# Patient Record
Sex: FEMALE | Race: WHITE
[De-identification: names, ages, dates, MRNs, and addresses within clinical notes are randomized per-mention and may not be internally consistent; named-entity substitution may affect disease eponyms.]

---

## 2020-09-05 ENCOUNTER — HOSPITAL ENCOUNTER (EMERGENCY)
Dept: HOSPITAL 76 - ED | Age: 30
Discharge: HOME | End: 2020-09-05
Payer: COMMERCIAL

## 2020-09-05 VITALS — SYSTOLIC BLOOD PRESSURE: 128 MMHG | DIASTOLIC BLOOD PRESSURE: 79 MMHG

## 2020-09-05 DIAGNOSIS — Z3A.01: ICD-10-CM

## 2020-09-05 DIAGNOSIS — O20.9: Primary | ICD-10-CM

## 2020-09-05 LAB
ALBUMIN DIAFP-MCNC: 4.5 G/DL (ref 3.2–5.5)
ALBUMIN/GLOB SERPL: 1.7 {RATIO} (ref 1–2.2)
ALP SERPL-CCNC: 48 IU/L (ref 42–121)
ALT SERPL W P-5'-P-CCNC: 14 IU/L (ref 10–60)
ANION GAP SERPL CALCULATED.4IONS-SCNC: 10 MMOL/L (ref 6–13)
AST SERPL W P-5'-P-CCNC: 17 IU/L (ref 10–42)
BASOPHILS NFR BLD AUTO: 0 10^3/UL (ref 0–0.1)
BASOPHILS NFR BLD AUTO: 0.6 %
BILIRUB BLD-MCNC: 0.6 MG/DL (ref 0.2–1)
BUN SERPL-MCNC: 11 MG/DL (ref 6–20)
CALCIUM UR-MCNC: 9.3 MG/DL (ref 8.5–10.3)
CHLORIDE SERPL-SCNC: 103 MMOL/L (ref 101–111)
CLARITY UR REFRACT.AUTO: CLEAR
CO2 SERPL-SCNC: 28 MMOL/L (ref 21–32)
CREAT SERPLBLD-SCNC: 0.7 MG/DL (ref 0.4–1)
EOSINOPHIL # BLD AUTO: 0 10^3/UL (ref 0–0.7)
EOSINOPHIL NFR BLD AUTO: 0.6 %
ERYTHROCYTE [DISTWIDTH] IN BLOOD BY AUTOMATED COUNT: 11.9 % (ref 12–15)
GLOBULIN SER-MCNC: 2.7 G/DL (ref 2.1–4.2)
GLUCOSE SERPL-MCNC: 102 MG/DL (ref 70–100)
GLUCOSE UR QL STRIP.AUTO: NEGATIVE MG/DL
HGB UR QL STRIP: 13.9 G/DL (ref 12–16)
KETONES UR QL STRIP.AUTO: NEGATIVE MG/DL
LIPASE SERPL-CCNC: 26 U/L (ref 22–51)
LYMPHOCYTES # SPEC AUTO: 2 10^3/UL (ref 1.5–3.5)
LYMPHOCYTES NFR BLD AUTO: 28.8 %
MCH RBC QN AUTO: 31.6 PG (ref 27–31)
MCHC RBC AUTO-ENTMCNC: 33.8 G/DL (ref 32–36)
MCV RBC AUTO: 93.4 FL (ref 81–99)
MONOCYTES # BLD AUTO: 0.6 10^3/UL (ref 0–1)
MONOCYTES NFR BLD AUTO: 8.4 %
NEUTROPHILS # BLD AUTO: 4.2 10^3/UL (ref 1.5–6.6)
NEUTROPHILS # SNV AUTO: 6.8 X10^3/UL (ref 4.8–10.8)
NEUTROPHILS NFR BLD AUTO: 61.3 %
NITRITE UR QL STRIP.AUTO: NEGATIVE
PDW BLD AUTO: 9.8 FL (ref 7.9–10.8)
PH UR STRIP.AUTO: 5.5 PH (ref 5–7.5)
PLATELET # BLD: 352 10^3/UL (ref 130–450)
PROT SPEC-MCNC: 7.2 G/DL (ref 6.7–8.2)
PROT UR STRIP.AUTO-MCNC: NEGATIVE MG/DL
RBC # UR STRIP.AUTO: (no result) /UL
RBC # URNS HPF: (no result) /HPF (ref 0–5)
RBC MAR: 4.4 10^6/UL (ref 4.2–5.4)
SODIUM SERPLBLD-SCNC: 141 MMOL/L (ref 135–145)
SP GR UR STRIP.AUTO: 1.02 (ref 1–1.03)
SQUAMOUS URNS QL MICRO: (no result)
UROBILINOGEN UR QL STRIP.AUTO: (no result) E.U./DL
UROBILINOGEN UR STRIP.AUTO-MCNC: NEGATIVE MG/DL

## 2020-09-05 PROCEDURE — 86900 BLOOD TYPING SEROLOGIC ABO: CPT

## 2020-09-05 PROCEDURE — 76801 OB US < 14 WKS SINGLE FETUS: CPT

## 2020-09-05 PROCEDURE — 81001 URINALYSIS AUTO W/SCOPE: CPT

## 2020-09-05 PROCEDURE — 85025 COMPLETE CBC W/AUTO DIFF WBC: CPT

## 2020-09-05 PROCEDURE — 87086 URINE CULTURE/COLONY COUNT: CPT

## 2020-09-05 PROCEDURE — 76817 TRANSVAGINAL US OBSTETRIC: CPT

## 2020-09-05 PROCEDURE — 36415 COLL VENOUS BLD VENIPUNCTURE: CPT

## 2020-09-05 PROCEDURE — 83690 ASSAY OF LIPASE: CPT

## 2020-09-05 PROCEDURE — 81003 URINALYSIS AUTO W/O SCOPE: CPT

## 2020-09-05 PROCEDURE — 86901 BLOOD TYPING SEROLOGIC RH(D): CPT

## 2020-09-05 PROCEDURE — 99284 EMERGENCY DEPT VISIT MOD MDM: CPT

## 2020-09-05 PROCEDURE — 80053 COMPREHEN METABOLIC PANEL: CPT

## 2020-09-05 PROCEDURE — 84702 CHORIONIC GONADOTROPIN TEST: CPT

## 2020-09-05 NOTE — ULTRASOUND REPORT
PROCEDURE: OB Transvaginal

 

INDICATIONS: 6 weeks preg, vag bleed

 

TECHNIQUE: Please refer to the dedicated combined transabdominal and transvaginal scanning sonographi
c report for this patient same day.

 

COMPARISON: None.

 

FINDINGS: 

Please refer to prior report from same day.

 

IMPRESSION: 

Please refer to prior report from same day.

 

Reviewed by: Ab Olvera MD on 9/5/2020 3:05 PM PDT

Approved by: Ab Olvera MD on 9/5/2020 3:05 PM PDT

 

 

Station ID:  [id number]

## 2020-09-05 NOTE — ULTRASOUND REPORT
PROCEDURE:  OB First Trimester

 

INDICATIONS:  6 weeks preg, vag bleed

 

OUTSIDE/PRIOR DATING DATA:  

Last menstrual period (LMP): 7/22/2020.  

LMP-based estimated date of delivery (MARIA): 4/28/2021.  

First dating scan (date and location): Not established by this study.  

Estimated date of delivery (MARIA) from first dating scan: Not established by this study.  

 

TECHNIQUE:  

Real-time scanning was performed of the fetus and maternal pelvic organs, with image documentation.  


 

COMPARISON:  None.

 

FINDINGS:  An intrauterine gestation is not currently seen. A definite intrauterine gestational sac a
lso is not found. A saclike structure however is present measuring 4 mm in average diameter and this 
could represent a very early gestational sac with estimated gestational age from this finding of 5 we
eks 1 day.

 

Embryo:  Not seen at this time.

 

Measurement variability in dating:  +/- 4 weeks by LMP, +/- 7 days by mean sac diameter (use before 6
 weeks gestation if crown-rump length not able to be measured), +/- 5 days by crown-rump length (6-12
 weeks gestation).  

 

Maternal organs:  Ovaries normal considering gestational status.  Limited images through the kidneys 
demonstrate no hydronephrosis.  

 

IMPRESSION:  

A definite intrauterine gestation is not currently found. Follow-up in 7-10 days is recommended for m
ore accurate assessment. No secondary sonographic evidence of ectopic pregnancy also. Correlation wit
h quantitative beta hCG may be the appropriate next step today.

 

Reviewed by: Ab Olvera MD on 9/5/2020 3:04 PM PDT

Approved by: Ab Olvera MD on 9/5/2020 3:04 PM PDT

 

 

Station ID:  [id number]

## 2021-10-22 NOTE — ED PHYSICIAN DOCUMENTATION
History of Present Illness





- Stated complaint


Stated Complaint: FEMALE 





- Chief complaint


Chief Complaint: General





- History obtained from


History obtained from: Patient





- History of Present Illness


Timing: Today


Pain level max: 0


Pain level now: 0





- Additonal information


Additional information: 





Patient is a 29-year-old female who presents to the emergency department with 2 

days of vaginal spotting.  Nothing makes it better or worse.  She is  1, 

para 0.  Approximately 6 weeks EGA.  Mild abdominal cramping.  No fevers.  She 

lives in [place].  Is not on any medications other than prenatal vitamins.  No 

fevers.  No chills.  Nothing makes it better or worse.





Review of Systems


Constitutional: denies: Fever, Chills


Nose: denies: Rhinorrhea / runny nose, Congestion


Throat: denies: Sore throat


Cardiac: denies: Chest pain / pressure


Respiratory: denies: Cough


GI: denies: Nausea, Vomiting, Diarrhea


: denies: Dysuria, Frequency, Hesitancy


Skin: denies: Rash


Musculoskeletal: denies: Neck pain, Back pain


Neurologic: denies: Headache





PD PAST MEDICAL HISTORY





- Past Medical History


Past Medical History: No





- Past Surgical History


Past Surgical History: No





- Living Situation


Living Situation: reports: With family


Living Arrangement: reports: At home





- Social History


Does the pt smoke?: No


Does the pt have substance abuse?: No





- Family History


Family history: reports: Non contributory





PD ED PE NORMAL





- Vitals


Vital signs reviewed: Yes





- General


General: Alert and oriented X 3, No acute distress, Well developed/nourished





- HEENT


HEENT: Moist mucous membranes





- Neck


Neck: Supple, no meningeal sign





- Cardiac


Cardiac: RRR, Strong equal pulses





- Respiratory


Respiratory: No respiratory distress, Clear bilaterally





- Abdomen


Abdomen: Soft, Non tender, Non distended





- Back


Back: No CVA TTP





- Derm


Derm: Warm and dry





- Extremities


Extremities: No edema





- Neuro


Neuro: Alert and oriented X 3





- Psych


Psych: Normal mood, Normal affect





Results





- Vitals


Vitals: 


                               Vital Signs - 24 hr











  20





  13:47 15:26


 


Temperature 36.9 C 37.6 C H


 


Heart Rate 82 73


 


Respiratory 22 18





Rate  


 


Blood Pressure 161/81 H 128/79


 


O2 Saturation 99 99








                                     Oxygen











O2 Source                      Room air

















- Labs


Labs: 


                                Laboratory Tests











  20/20





  14:15 14:15 14:15


 


WBC   6.8 


 


RBC   4.40 


 


Hgb   13.9 


 


Hct   41.1 


 


MCV   93.4 


 


MCH   31.6 H 


 


MCHC   33.8 


 


RDW   11.9 L 


 


Plt Count   352 


 


MPV   9.8 


 


Neut # (Auto)   4.2 


 


Lymph # (Auto)   2.0 


 


Mono # (Auto)   0.6 


 


Eos # (Auto)   0.0 


 


Baso # (Auto)   0.0 


 


Absolute Nucleated RBC   0.00 


 


Nucleated RBC %   0.0 


 


Sodium    141


 


Potassium    3.5


 


Chloride    103


 


Carbon Dioxide    28


 


Anion Gap    10.0


 


BUN    11


 


Creatinine    0.7


 


Estimated GFR (MDRD)    100


 


Glucose    102 H


 


Calcium    9.3


 


Total Bilirubin    0.6


 


AST    17


 


ALT    14


 


Alkaline Phosphatase    48


 


Total Protein    7.2


 


Albumin    4.5


 


Globulin    2.7


 


Albumin/Globulin Ratio    1.7


 


Lipase    26


 


HCG, Quant   


 


Urine Color   


 


Urine Clarity   


 


Urine pH   


 


Ur Specific Gravity   


 


Urine Protein   


 


Urine Glucose (UA)   


 


Urine Ketones   


 


Urine Occult Blood   


 


Urine Nitrite   


 


Urine Bilirubin   


 


Urine Urobilinogen   


 


Ur Leukocyte Esterase   


 


Urine RBC   


 


Urine WBC   


 


Ur Squamous Epith Cells   


 


Urine Bacteria   


 


Ur Microscopic Review   


 


Urine Culture Comments   


 


Blood Type  A POSITIVE  














  20





  14:15 14:26


 


WBC  


 


RBC  


 


Hgb  


 


Hct  


 


MCV  


 


MCH  


 


MCHC  


 


RDW  


 


Plt Count  


 


MPV  


 


Neut # (Auto)  


 


Lymph # (Auto)  


 


Mono # (Auto)  


 


Eos # (Auto)  


 


Baso # (Auto)  


 


Absolute Nucleated RBC  


 


Nucleated RBC %  


 


Sodium  


 


Potassium  


 


Chloride  


 


Carbon Dioxide  


 


Anion Gap  


 


BUN  


 


Creatinine  


 


Estimated GFR (MDRD)  


 


Glucose  


 


Calcium  


 


Total Bilirubin  


 


AST  


 


ALT  


 


Alkaline Phosphatase  


 


Total Protein  


 


Albumin  


 


Globulin  


 


Albumin/Globulin Ratio  


 


Lipase  


 


HCG, Quant  605.61 


 


Urine Color   YELLOW


 


Urine Clarity   CLEAR


 


Urine pH   5.5


 


Ur Specific Gravity   1.025


 


Urine Protein   NEGATIVE


 


Urine Glucose (UA)   NEGATIVE


 


Urine Ketones   NEGATIVE


 


Urine Occult Blood   MODERATE H


 


Urine Nitrite   NEGATIVE


 


Urine Bilirubin   NEGATIVE


 


Urine Urobilinogen   0.2 (NORMAL)


 


Ur Leukocyte Esterase   NEGATIVE


 


Urine RBC   0-5


 


Urine WBC   0-3


 


Ur Squamous Epith Cells   FEW Squamous


 


Urine Bacteria   Few


 


Ur Microscopic Review   INDICATED


 


Urine Culture Comments   NOT INDICATED


 


Blood Type  














- Rads (name of study)


  ** OB ultrasound


Radiology: Prelim report reviewed, EMP read contemporaneously, See rad report





PD MEDICAL DECISION MAKING





- ED course


Complexity details: reviewed results, re-evaluated patient, considered 

differential, d/w patient, d/w family


ED course: 


29-year-old female,  1 para 0 presents to the emergency department with 

vaginal bleeding.  hCG is low at 600, no definite IUP is seen, no evidence of 

ectopic.  Has a appointment with her OB in approximately 3 days.  We will have 

her repeat her hCG at that time to see if it is increasing or decreasing.  

Possible early pregnancy versus miscarriage versus ectopic.  No evidence of 

ectopic at this time on ultrasound.  Patient counseled regarding signs and 

symptoms for which I believe and urgent re-evaluation would be necessary. 

Patient with good understanding of and agreement to plan and is comfortable 

going home at this time





This document was made in part using voice recognition software. While efforts 

are made to proofread this document, sound alike and grammatical errors may 

occur.











IMPRESSION: 


A definite intrauterine gestation is not currently found. Follow-up in 7-10 days

is recommended for


more accurate assessment. No secondary sonographic evidence of ectopic pregnancy

also. Correlation 


with quantitative beta hCG may be the appropriate next step today. 





Departure





- Departure


Disposition: 01 Home, Self Care


Clinical Impression: 


 Vaginal bleeding affecting early pregnancy





Condition: Good


Instructions:  ED Miscarriage Poss, ED Prenatal Care


Follow-Up: 


Provider,Other [Primary Care Provider] - Within 3 Days


Comments: 


Return if you worsen.  Your hCG is around 600 today.  This could represent early

pregnancy versus a miscarriage.  You will need to have this level redrawn in 

approximately 3 days with your doctor.  They may want to repeat your ultrasound 

in 1 week as well.  The report is below.





FINDINGS: An intrauterine gestation is not currently seen. A definite i

ntrauterine gestational sac


also is not found. A saclike structure however is present measuring 4 mm in 

average diameter and 


this could represent a very early gestational sac with estimated gestational age

from this finding 


of 5 weeks 1 day. 





Embryo: Not seen at this time. 





Measurement variability in dating: +/- 4 weeks by LMP, +/- 7 days by mean sac 

diameter (use before


6 weeks gestation if crown-rump length not able to be measured), +/- 5 days by 

crown-rump length (6-


12 weeks gestation). 





Maternal organs: Ovaries normal considering gestational status. Limited images 

through the 


kidneys demonstrate no hydronephrosis. 





IMPRESSION: 


A definite intrauterine gestation is not currently found. Follow-up in 7-10 days

is recommended for


more accurate assessment. No secondary sonographic evidence of ectopic pregnancy

also. Correlation 


with quantitative beta hCG may be the appropriate next step today.
No